# Patient Record
Sex: MALE | Race: WHITE | NOT HISPANIC OR LATINO
[De-identification: names, ages, dates, MRNs, and addresses within clinical notes are randomized per-mention and may not be internally consistent; named-entity substitution may affect disease eponyms.]

---
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
## 2024-02-09 RX ORDER — ADALIMUMAB 40MG/0.4ML
40 KIT SUBCUTANEOUS
Qty: 1 | Refills: 5 | Status: ACTIVE | COMMUNITY
Start: 2020-08-05 | End: 1900-01-01

## 2024-04-22 ENCOUNTER — APPOINTMENT (OUTPATIENT)
Dept: PEDIATRIC GASTROENTEROLOGY | Facility: CLINIC | Age: 19
End: 2024-04-22
Payer: COMMERCIAL

## 2024-04-22 VITALS
WEIGHT: 139.33 LBS | HEIGHT: 69.61 IN | HEART RATE: 71 BPM | DIASTOLIC BLOOD PRESSURE: 77 MMHG | SYSTOLIC BLOOD PRESSURE: 119 MMHG | BODY MASS INDEX: 20.17 KG/M2

## 2024-04-22 DIAGNOSIS — E34.31 CONSTITUTIONAL SHORT STATURE: ICD-10-CM

## 2024-04-22 DIAGNOSIS — K50.018 CROHN'S DISEASE OF SMALL INTESTINE WITH OTHER COMPLICATION: ICD-10-CM

## 2024-04-22 DIAGNOSIS — Z79.899 OTHER LONG TERM (CURRENT) DRUG THERAPY: ICD-10-CM

## 2024-04-22 LAB
25(OH)D3 SERPL-MCNC: 33.9 NG/ML
ALBUMIN SERPL ELPH-MCNC: 4.8 G/DL
ALP BLD-CCNC: 125 U/L
ALT SERPL-CCNC: 11 U/L
ANION GAP SERPL CALC-SCNC: 10 MMOL/L
AST SERPL-CCNC: 12 U/L
BASOPHILS # BLD AUTO: 0.03 K/UL
BASOPHILS NFR BLD AUTO: 0.5 %
BILIRUB SERPL-MCNC: 0.4 MG/DL
BUN SERPL-MCNC: 15 MG/DL
CALCIUM SERPL-MCNC: 11 MG/DL
CHLORIDE SERPL-SCNC: 101 MMOL/L
CO2 SERPL-SCNC: 29 MMOL/L
CREAT SERPL-MCNC: 1.05 MG/DL
CRP SERPL-MCNC: <3 MG/L
EGFR: 105 ML/MIN/1.73M2
EOSINOPHIL # BLD AUTO: 0.11 K/UL
EOSINOPHIL NFR BLD AUTO: 1.7 %
ERYTHROCYTE [SEDIMENTATION RATE] IN BLOOD BY WESTERGREN METHOD: 3 MM/HR
HCT VFR BLD CALC: 47.1 %
HGB BLD-MCNC: 15.6 G/DL
IMM GRANULOCYTES NFR BLD AUTO: 0.3 %
LYMPHOCYTES # BLD AUTO: 2.25 K/UL
LYMPHOCYTES NFR BLD AUTO: 35.7 %
MAN DIFF?: NORMAL
MCHC RBC-ENTMCNC: 29.9 PG
MCHC RBC-ENTMCNC: 33.1 GM/DL
MCV RBC AUTO: 90.2 FL
MONOCYTES # BLD AUTO: 0.52 K/UL
MONOCYTES NFR BLD AUTO: 8.2 %
NEUTROPHILS # BLD AUTO: 3.38 K/UL
NEUTROPHILS NFR BLD AUTO: 53.6 %
PLATELET # BLD AUTO: 328 K/UL
POTASSIUM SERPL-SCNC: 5 MMOL/L
PROT SERPL-MCNC: 7.5 G/DL
RBC # BLD: 5.22 M/UL
RBC # FLD: 12.4 %
SODIUM SERPL-SCNC: 140 MMOL/L
WBC # FLD AUTO: 6.31 K/UL

## 2024-04-22 PROCEDURE — 99214 OFFICE O/P EST MOD 30 MIN: CPT

## 2024-04-22 PROCEDURE — G2211 COMPLEX E/M VISIT ADD ON: CPT

## 2024-04-22 RX ORDER — CHROMIUM 200 MCG
TABLET ORAL
Refills: 0 | Status: COMPLETED | COMMUNITY
End: 2024-04-22

## 2024-04-22 NOTE — ASSESSMENT
[Remission] : Remission [Educated Patient & Family about Diagnosis] : educated the patient and family about the diagnosis [FreeTextEntry1] : Crohn's - jejunal and ileal - clinically quiescent REC: 1. Blood work today 2, Will initiate a preauthorization for f/u MRE for reassessment of disease extent and activity 3. Call prn, f/u GI 1 year

## 2024-04-22 NOTE — HISTORY OF PRESENT ILLNESS
[Crohn's Disease] : Crohn's Disease  [Jejunum] : jejunum [Ileum] : ileum [Perianal Disease] : The patient does not have perianal disease. [None] : none = score of (0) [0 - 1 Formed- Liquid, No Blood] : 0 - 1 formed - liquid, no blood = score of (0) [Well, No Limitation of Activities] : well, no limitation of activities = score of ( 0 ) [No Tenderness, No Mass] : no tenderness, no mass =  score of ( 0 ) [None, Asymptomatic Tags] : none, asymptomatic tags = score of ( 0 ) [None] : none = score of ( 0 ) [Weight gain or voluntary weight stable/loss] : weight gain or voluntary weight stable/loss = score of ( 0 ) [Ht Velocity  > or = - 1 SD] : ht velocity  > or = - 1 SD = score of ( 0 ) [de-identified] : 2018 [de-identified] : 12 yo boy with Hx short stature and slow weight gain/growth due to Crohn's. W/U included normal EGD/colonoscopy and Bx; negative IBD serologies; MRE with inflamed jejunal and ileal loops; elevated calprotectin (296). On subQ methotrexate 0.6 ml (15 mg) weekly without difficulty (mother injecting) and  daily folate supplement. He has not had any nausea and has d/c'd the ondansetron. Weight gain continues to improve and pt has grown 6 cm in the past year. He follows growth with Peds Endo but at present there are no plans for GH testing. Leaving for 8 week summer camp shortly.  Interval Hx 8/27/2018: Has been asymptomatic over the summer. Normal activity at camp, no fevers or other systemic symptoms. BM formed and nonbloody qd. Continues on MTX 15 mg subQ weekly and qd folate. Entering 9th grade  Interval Hx (3/7/2019): Remains well with only rare c/o abdominal pain. No fevers, dyspepsia, systemic symptoms. Gaining weight and growing parallel to but below the 1% for age, but mother recognizes that many family members "grew late". On Rx (methotrexate subQ and po folate) about a year with adverse effects. Mother and child both =pleased with his clinical progress. Now in 9th grade - likes the high school.  Interval Hx 11/26/2018: Remains well. No c/o pain, loose stools, fevers or other systemic symptoms. Using one Boost 45 kcal/oz supplement qd in addition to meals, and has continued gaining weight. Hgt increase >6 cm in past 12 months. Tolerating weekly methotrexate without nausea or other problem. Now in 9th grade.  Interval Hx 6/24/2019 Continues to be well, on MTX 0.6 ml (12.5 mg) subQ weekly and daily folic acid. Admits to developing nausea "as soon as he smells the alcohol pad", but then well after in the injection. No cramps, BM formed qd, no fevers or other systemic symptoms. Gaining weight and growing along his usual curve despite having cut back the daily caloric supplement he's previously been taking. Continues seeing Peds Endo, and considering hormonal therapy once his bone age advances a bit more. Otherwise well. Last blood work 11/2018, calprotectin 90 in March at which time MRE revealed complete resolution of initially identified small bowel inflammation.  interval Hx 10-7-2019 Reportedly asymptomatic on parenteral MTX 0.6 ml weekly. Past 2 weeks has not been nauseous or vomited with the treatment, symptoms both pt and mother recognize is "all in his head". Pt reports no fevers, abdominal pain, diarrhea, bleeding or other systemic symptoms. Mother remains concerned about growth, but has an appt scheduled soon to again discuss possible GH deficiency with [facility].   Interval Hx (2/10/2020) Feeling well. Denies cramps, diarrhea, arthralgias, fevers, other systemic symptoms. Appetite good. Taking methotrexate 17.5 mg subQ weekly (Mom administers), folate qd, no ondansetron, and reports no nausea, vomiting or other untoward reaction to meds. 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 He is currently in 10th grade  Interval Hx 8/3/2020 Feels well. No cramps, appetite and activity normal. No fevers or other systemic symptoms. In maintenance phase of CDED and tolerating the diet fairly well. Labs show a gradual trend of mild increases in CRP, ESR and calprotectin. Family recognizes that these trends have developed as the CDED has been liberalized. Child otherwise well. 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  Interval Hx 9/21/2020 Started Humira 40 mg q2 wks after induction early last month. Reports feeling "no different", as he continues to deny any cramps, diarrhea, rectal bleeding, arthralgias or other systemic symptom. Doesn't feel like his appetite has changed, but has gained nearly 4 kg since starting the Humira. Starting to self-inject, although it takes him a while to get braze enough to push the button on the pen. Continues taking Ca and D3, and drinking 1-2 cans of Boost intermittently.  3/25/2021 Remains asymptomatic on Humira 40 mg q2 wks. Self injecting without a problem. Appetite improved and gaining weight and rowing well despite discontinuing oral caloric supplements. BM formed without blood qd. No fevers or other systemic symptoms. Remains on Ca/D3 supplements.  9/20/2021 Continues asymptomatic on Humira 40 mg q2 weeks. Normal appetite and activity, no fevers or other systemic symptoms, formed nonbloody BM qd. Has had 2 doses of COVID vaccine and interested in a booster if appropriate. Remains of vit D, Ca supplement. Entering senior year in , hoping to spend a year in [place] after graduation.  3/31/2022 Asymptomatic throughout the 2 week cycle of Humira 40 mg. No cramps, diarrhea, arthralgias, rectal bleeding. Appetite and activity excellent, continues growing. Finishing  and planning a year in [facility] starting in the fall. Labs last Sept wnl, with ADA level 7.7  6/19/2023 Remains well on Humira 40 mg q2 weeks. Admits to taking vit D and Ca only irregularly. Has been in [place] for the past year, and reports excellent health. Appetite and activity excellent, normal stools without rectal bleeding bid. No fevers. arthralgias, rashes. 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   4/22/2024 Nearly 20 yo with jejunal and ileal Crohn's, maintained on Humira 40 mg q2 wks. Denies use of other medications or supplements. Pt reports no problems with administering the med and states that he is entirely well. No fevers, arthralgias, cramps, diarrhea. Appetite and activity excellent, weight stable. Home after more than a year overseas in [employer] [facility], now in college at [employer] studying business.

## 2024-04-24 LAB
M TB IFN-G BLD-IMP: NEGATIVE
QUANTIFERON TB PLUS MITOGEN MINUS NIL: >10 IU/ML
QUANTIFERON TB PLUS NIL: 0.02 IU/ML
QUANTIFERON TB PLUS TB1 MINUS NIL: 0 IU/ML
QUANTIFERON TB PLUS TB2 MINUS NIL: 0 IU/ML

## 2024-05-21 ENCOUNTER — RESULT REVIEW (OUTPATIENT)
Age: 19
End: 2024-05-21

## 2024-05-21 ENCOUNTER — OUTPATIENT (OUTPATIENT)
Dept: OUTPATIENT SERVICES | Age: 19
LOS: 1 days | End: 2024-05-21

## 2024-05-21 ENCOUNTER — APPOINTMENT (OUTPATIENT)
Dept: MRI IMAGING | Facility: HOSPITAL | Age: 19
End: 2024-05-21
Payer: COMMERCIAL

## 2024-05-21 DIAGNOSIS — K50.90 CROHN'S DISEASE, UNSPECIFIED, WITHOUT COMPLICATIONS: ICD-10-CM

## 2024-05-21 PROCEDURE — 72197 MRI PELVIS W/O & W/DYE: CPT | Mod: 26

## 2024-05-21 PROCEDURE — 74183 MRI ABD W/O CNTR FLWD CNTR: CPT | Mod: 26

## 2024-07-15 ENCOUNTER — RX RENEWAL (OUTPATIENT)
Age: 19
End: 2024-07-15

## 2024-12-13 ENCOUNTER — RX RENEWAL (OUTPATIENT)
Age: 19
End: 2024-12-13

## 2024-12-13 ENCOUNTER — NON-APPOINTMENT (OUTPATIENT)
Age: 19
End: 2024-12-13

## 2024-12-13 RX ORDER — ADALIMUMAB 40MG/0.4ML
40 KIT SUBCUTANEOUS
Qty: 1 | Refills: 5 | Status: ACTIVE | COMMUNITY
Start: 2024-12-13 | End: 1900-01-01

## 2025-03-10 ENCOUNTER — NON-APPOINTMENT (OUTPATIENT)
Age: 20
End: 2025-03-10

## 2025-08-07 ENCOUNTER — NON-APPOINTMENT (OUTPATIENT)
Age: 20
End: 2025-08-07

## 2025-08-14 ENCOUNTER — APPOINTMENT (OUTPATIENT)
Dept: PEDIATRIC GASTROENTEROLOGY | Facility: CLINIC | Age: 20
End: 2025-08-14
Payer: COMMERCIAL

## 2025-08-14 VITALS
BODY MASS INDEX: 20.86 KG/M2 | WEIGHT: 145.73 LBS | HEIGHT: 70.16 IN | DIASTOLIC BLOOD PRESSURE: 75 MMHG | HEART RATE: 48 BPM | SYSTOLIC BLOOD PRESSURE: 114 MMHG

## 2025-08-14 DIAGNOSIS — Z79.899 OTHER LONG TERM (CURRENT) DRUG THERAPY: ICD-10-CM

## 2025-08-14 DIAGNOSIS — K50.018 CROHN'S DISEASE OF SMALL INTESTINE WITH OTHER COMPLICATION: ICD-10-CM

## 2025-08-14 PROCEDURE — 99214 OFFICE O/P EST MOD 30 MIN: CPT

## 2025-08-18 LAB
ALBUMIN SERPL ELPH-MCNC: 4.9 G/DL
ALP BLD-CCNC: 122 U/L
ALT SERPL-CCNC: 13 U/L
ANION GAP SERPL CALC-SCNC: 11 MMOL/L
AST SERPL-CCNC: 14 U/L
BASOPHILS # BLD AUTO: 0.03 K/UL
BASOPHILS NFR BLD AUTO: 0.6 %
BILIRUB SERPL-MCNC: 0.8 MG/DL
BUN SERPL-MCNC: 16 MG/DL
CALCIUM SERPL-MCNC: 10.6 MG/DL
CHLORIDE SERPL-SCNC: 103 MMOL/L
CO2 SERPL-SCNC: 27 MMOL/L
CREAT SERPL-MCNC: 1.06 MG/DL
CRP SERPL-MCNC: <3 MG/L
EGFRCR SERPLBLD CKD-EPI 2021: 102 ML/MIN/1.73M2
EOSINOPHIL # BLD AUTO: 0.15 K/UL
EOSINOPHIL NFR BLD AUTO: 3 %
ERYTHROCYTE [SEDIMENTATION RATE] IN BLOOD BY WESTERGREN METHOD: 5 MM/HR
HBV SURFACE AG SER QL: NONREACTIVE
HCT VFR BLD CALC: 46.5 %
HGB BLD-MCNC: 15 G/DL
IMM GRANULOCYTES NFR BLD AUTO: 0.2 %
LYMPHOCYTES # BLD AUTO: 2.03 K/UL
LYMPHOCYTES NFR BLD AUTO: 41.3 %
MAN DIFF?: NORMAL
MCHC RBC-ENTMCNC: 29.3 PG
MCHC RBC-ENTMCNC: 32.3 G/DL
MCV RBC AUTO: 90.8 FL
MONOCYTES # BLD AUTO: 0.43 K/UL
MONOCYTES NFR BLD AUTO: 8.7 %
NEUTROPHILS # BLD AUTO: 2.27 K/UL
NEUTROPHILS NFR BLD AUTO: 46.2 %
PLATELET # BLD AUTO: 274 K/UL
POTASSIUM SERPL-SCNC: 5.2 MMOL/L
PROT SERPL-MCNC: 7.7 G/DL
RBC # BLD: 5.12 M/UL
RBC # FLD: 12.5 %
SODIUM SERPL-SCNC: 141 MMOL/L
WBC # FLD AUTO: 4.92 K/UL

## 2025-08-20 LAB
M TB IFN-G BLD-IMP: NEGATIVE
QUANTIFERON TB PLUS MITOGEN MINUS NIL: >10 IU/ML
QUANTIFERON TB PLUS NIL: 0.02 IU/ML
QUANTIFERON TB PLUS TB1 MINUS NIL: 0 IU/ML
QUANTIFERON TB PLUS TB2 MINUS NIL: 0.01 IU/ML

## 2025-08-21 LAB — CALPROTECTIN FECAL: 20 UG/G

## 2025-09-04 RX ORDER — ADALIMUMAB-BWWD 40 MG/.4ML
40 SOLUTION SUBCUTANEOUS
Qty: 1 | Refills: 5 | Status: ACTIVE | COMMUNITY
Start: 2025-09-04 | End: 1900-01-01